# Patient Record
Sex: FEMALE
[De-identification: names, ages, dates, MRNs, and addresses within clinical notes are randomized per-mention and may not be internally consistent; named-entity substitution may affect disease eponyms.]

---

## 2017-05-10 ENCOUNTER — APPOINTMENT (OUTPATIENT)
Dept: PLASTIC SURGERY | Facility: CLINIC | Age: 42
End: 2017-05-10

## 2017-11-15 ENCOUNTER — APPOINTMENT (OUTPATIENT)
Dept: PLASTIC SURGERY | Facility: CLINIC | Age: 42
End: 2017-11-15
Payer: SELF-PAY

## 2019-04-29 ENCOUNTER — APPOINTMENT (OUTPATIENT)
Dept: PLASTIC SURGERY | Facility: CLINIC | Age: 44
End: 2019-04-29
Payer: SELF-PAY

## 2019-05-01 NOTE — REASON FOR VISIT
[Follow-Up: _____] : a [unfilled] follow-up visit [FreeTextEntry1] : Pt presents here for repeat botox injections.

## 2019-05-01 NOTE — PROCEDURE
[FreeTextEntry1] : Ko [FreeTextEntry6] : Glabella 14\par Forehead 6\par Crows feet 3 each\par lips 2\par \par 28 units tolerated well  [FreeTextEntry2] : Botox

## 2019-05-13 ENCOUNTER — APPOINTMENT (OUTPATIENT)
Dept: PLASTIC SURGERY | Facility: CLINIC | Age: 44
End: 2019-05-13
Payer: SELF-PAY

## 2019-05-13 PROCEDURE — 99024 POSTOP FOLLOW-UP VISIT: CPT

## 2019-05-25 NOTE — PROCEDURE
[FreeTextEntry2] : Botox [FreeTextEntry1] : Ko [FreeTextEntry6] : 2 additional units given to forehead for symmetry. tolerated well

## 2019-08-28 ENCOUNTER — APPOINTMENT (OUTPATIENT)
Dept: PLASTIC SURGERY | Facility: CLINIC | Age: 44
End: 2019-08-28
Payer: SELF-PAY

## 2019-08-28 NOTE — PROCEDURE
[FreeTextEntry1] : Ko [FreeTextEntry6] : Botox\par Glabella 14\par Forehead 8\par Crows feet 3 each\par lip 2\par \par 30 units total tolerated well  [FreeTextEntry2] : Botox

## 2019-12-12 ENCOUNTER — APPOINTMENT (OUTPATIENT)
Dept: PLASTIC SURGERY | Facility: CLINIC | Age: 44
End: 2019-12-12
Payer: COMMERCIAL

## 2019-12-12 DIAGNOSIS — L90.8 OTHER ATROPHIC DISORDERS OF SKIN: ICD-10-CM

## 2019-12-26 NOTE — PROCEDURE
[FreeTextEntry1] : Ko [FreeTextEntry2] : Botox [FreeTextEntry6] : Botox\par Glabella 14\par Crows feet 3 each\par Forehead 8\par Lips 2\par \par total 30 units tolerated well

## 2023-07-13 NOTE — REASON FOR VISIT
Tried contacting [facility] Swartz regarding Mr. ShafferHeidy test results and a follow up appointment in orthopedics no answer left message on voice mail   [Follow-Up: _____] : a [unfilled] follow-up visit [FreeTextEntry1] : Pt presents in the office today for repeat botox injections